# Patient Record
Sex: FEMALE | Race: OTHER | Employment: STUDENT | URBAN - METROPOLITAN AREA
[De-identification: names, ages, dates, MRNs, and addresses within clinical notes are randomized per-mention and may not be internally consistent; named-entity substitution may affect disease eponyms.]

---

## 2024-08-19 RX ORDER — LORATADINE 10 MG/1
10 TABLET ORAL
COMMUNITY

## 2024-08-20 ENCOUNTER — APPOINTMENT (OUTPATIENT)
Age: 24
End: 2024-08-20
Payer: COMMERCIAL

## 2024-08-20 ENCOUNTER — CLINICAL SUPPORT (OUTPATIENT)
Age: 24
End: 2024-08-20

## 2024-08-20 VITALS
BODY MASS INDEX: 22.63 KG/M2 | SYSTOLIC BLOOD PRESSURE: 123 MMHG | DIASTOLIC BLOOD PRESSURE: 69 MMHG | OXYGEN SATURATION: 99 % | WEIGHT: 123 LBS | HEART RATE: 99 BPM | HEIGHT: 62 IN

## 2024-08-20 DIAGNOSIS — Z11.1 ENCOUNTER FOR PPD TEST: Primary | ICD-10-CM

## 2024-08-20 DIAGNOSIS — J30.9 ALLERGIC RHINITIS, UNSPECIFIED SEASONALITY, UNSPECIFIED TRIGGER: ICD-10-CM

## 2024-08-20 DIAGNOSIS — R76.11 POSITIVE PPD: ICD-10-CM

## 2024-08-20 PROCEDURE — 71046 X-RAY EXAM CHEST 2 VIEWS: CPT

## 2024-08-20 PROCEDURE — 99212 OFFICE O/P EST SF 10 MIN: CPT | Performed by: FAMILY MEDICINE

## 2024-08-20 PROCEDURE — 86580 TB INTRADERMAL TEST: CPT

## 2024-08-20 NOTE — PROGRESS NOTES
Ambulatory Visit  Name: Sorin Vargas      : 2000      MRN: [id number]  Encounter Provider: RIGO Shea  Encounter Date: 2024   Encounter department: [facility]    Assessment & Plan   1. Encounter for PPD test  -     TB Skin Test  -     Quantiferon TB Gold Plus Assay; Future  2. Positive PPD  -     XR chest pa & lateral; Future; Expected date: 2024  3. Allergic rhinitis, unspecified seasonality, unspecified trigger  Assessment & Plan:  Controlled. Patient is avoiding irritants.         History of Present Illness     Patient here for TB test for school and follow up. She denies any concerns today.      Review of Systems   Constitutional:  Negative for chills and fever.   HENT:  Negative for ear pain and sore throat.    Eyes:  Negative for pain and visual disturbance.   Respiratory:  Negative for cough and shortness of breath.    Cardiovascular:  Negative for chest pain and palpitations.   Gastrointestinal:  Negative for abdominal pain and vomiting.   Genitourinary:  Negative for dysuria and hematuria.   Musculoskeletal:  Negative for arthralgias and back pain.   Skin:  Negative for color change and rash.   Neurological:  Negative for seizures and syncope.   All other systems reviewed and are negative.    Past Medical History:   Diagnosis Date    Allergic rhinitis     Otalgia      History reviewed. No pertinent surgical history.  Family History   Problem Relation Age of Onset    Cancer Maternal Grandfather      Social History     Tobacco Use    Smoking status: Never    Smokeless tobacco: Never   Vaping Use    Vaping status: Never Used   Substance and Sexual Activity    Alcohol use: Yes    Drug use: Never    Sexual activity: Yes     Partners: Male     Birth control/protection: Condom Male     Current Outpatient Medications on File Prior to Visit   Medication Sig    loratadine (CLARITIN) 10 mg tablet Take 10 mg by mouth     No Known Allergies  Immunization  "History   Administered Date(s) Administered    COVID-19 MODERNA VACC 0.5 ML IM 05/02/2021, 05/28/2021, 12/21/2021    INFLUENZA 03/13/2022    Tuberculin Skin Test-PPD Intradermal 08/20/2024     Objective     /69 (BP Location: Left arm, Patient Position: Sitting, Cuff Size: Standard)   Pulse 99   Ht 5' 2\" (1.575 m)   Wt 55.8 kg (123 lb)   SpO2 99%   BMI 22.50 kg/m²     Physical Exam  Vitals and nursing note reviewed.   Constitutional:       General: She is not in acute distress.     Appearance: She is well-developed.   HENT:      Head: Normocephalic and atraumatic.   Eyes:      Conjunctiva/sclera: Conjunctivae normal.   Cardiovascular:      Rate and Rhythm: Normal rate and regular rhythm.      Heart sounds: No murmur heard.  Pulmonary:      Effort: Pulmonary effort is normal. No respiratory distress.      Breath sounds: Normal breath sounds.   Abdominal:      Palpations: Abdomen is soft.      Tenderness: There is no abdominal tenderness.   Musculoskeletal:         General: No swelling.      Cervical back: Neck supple.   Skin:     General: Skin is warm and dry.      Capillary Refill: Capillary refill takes less than 2 seconds.   Neurological:      Mental Status: She is alert.   Psychiatric:         Mood and Affect: Mood normal.         "

## 2024-08-22 ENCOUNTER — CLINICAL SUPPORT (OUTPATIENT)
Age: 24
End: 2024-08-22

## 2024-08-22 DIAGNOSIS — Z11.1 ENCOUNTER FOR PPD TEST: Primary | ICD-10-CM

## 2024-08-22 LAB
INDURATION: 0 MM
TB SKIN TEST: NEGATIVE